# Patient Record
Sex: MALE | Race: WHITE | Employment: OTHER | URBAN - METROPOLITAN AREA
[De-identification: names, ages, dates, MRNs, and addresses within clinical notes are randomized per-mention and may not be internally consistent; named-entity substitution may affect disease eponyms.]

---

## 2017-01-13 ENCOUNTER — TRANSFERRED RECORDS (OUTPATIENT)
Dept: HEALTH INFORMATION MANAGEMENT | Facility: CLINIC | Age: 57
End: 2017-01-13

## 2017-01-16 ENCOUNTER — TELEPHONE (OUTPATIENT)
Dept: NEUROLOGY | Facility: CLINIC | Age: 57
End: 2017-01-16

## 2017-01-16 NOTE — TELEPHONE ENCOUNTER
Who did the lab test & why?  I need the exact lab result before seeing pt.   If this is to check his Depakote level (I think it might be valproic acid), he needs to see whoever is prescribing it.    Thank you.

## 2017-01-16 NOTE — TELEPHONE ENCOUNTER
----- Message from Katya Fulton RN sent at 1/16/2017 12:30 PM CST -----  Regarding: Dr Saldivar pt  Contact: [phone]  States his Mysoline level is very high  25  Results should be coming soon.  Wants you to call him now because he works in a noisy place and would not be able to hear the phone ring.  Thanks.

## 2017-01-16 NOTE — TELEPHONE ENCOUNTER
Patient reports his mysoline level is at 25. Not sure if he is referring to his mysoline or phenobarbitol level.    Patient takes:    primidone (MYSOLINE) 250 MG tablet 450 tablet 3 6/10/2016  No      Si daily: 3 in the am; 1 in the afternoon and 1 at bedtime = 5/day x 90 days = 450 tabs     Called patient to discuss. He states his wife thinks his speech is a little slurry, but patient sounded like he was at his norm from this writer's standpoint. He denied any other possible side effects including: double vision, nausea, mood changes, weakness, lethargy.    Appointment made for tomorrow at noon with Meaghan Gramajo CNP for routine return visit.

## 2017-01-16 NOTE — TELEPHONE ENCOUNTER
Called patient back and LVM to call me back and let me know the followin. Who did the lab test and why?  2. Need the exact lab result faxed to [phone]  3. Was it for mysoline and not Depakote (valproic acid)?    Emailed this message to him as well.

## 2017-01-17 ENCOUNTER — OFFICE VISIT (OUTPATIENT)
Dept: NEUROLOGY | Facility: CLINIC | Age: 57
End: 2017-01-17

## 2017-01-17 VITALS
BODY MASS INDEX: 22.4 KG/M2 | DIASTOLIC BLOOD PRESSURE: 84 MMHG | HEART RATE: 84 BPM | HEIGHT: 71 IN | WEIGHT: 160 LBS | SYSTOLIC BLOOD PRESSURE: 129 MMHG

## 2017-01-17 DIAGNOSIS — R89.9 ABNORMAL LABORATORY TEST: ICD-10-CM

## 2017-01-17 DIAGNOSIS — G25.0 ESSENTIAL TREMOR: Primary | ICD-10-CM

## 2017-01-17 ASSESSMENT — MOVEMENT DISORDERS SOCIETY - UNIFIED PARKINSONS DISEASE RATING SCALE (MDS-UPDRS)
TREMOR: SLIGHT: SHAKING OR TREMOR OCCURS BUT DOES NOT CAUSE PROBLEMS WITH ANY ACTIVITIES.
GETTING_OUT_OF_BED_CAR_DEEP_CHAIR: NORMAL: NOT AT ALL (NO PROBLEMS).
SALIVA_AND_DROOLING: MILD: I HAVE SOME DROOLING DURING SLEEP, BUT NONE WHEN I AM AWAKE.
SPEECH: SLIGHT: MY SPEECH IS SOFT, SLURRED OR UNEVEN, BUT IT DOES NOT CAUSE OTHERS TO ASK ME TO REPEAT MYSELF.
HANDWRITING: SLIGHT: MY WRITING IS SLOW, CLUMSY OR UNEVEN, BUT ALL WORDS ARE CLEAR.
TOTAL_SCORE: INCOMPLETE
FREEZING: NORMAL: NOT AT ALL (NO PROBLEMS).
EATING_TASKS: MILD: I AM SLOW WITH MY EATING AND HAVE OCCASIONAL FOOD SPILLS.  I MAY NEED HELP WITH A FEW TASKS SUCH AS CUTTING MEAT.
DRESSING: NORMAL: NOT AT ALL (NO PROBLEMS).
HYGIENE: NORMAL: NOT AT ALL (NO PROBLEMS).
CHEWING_AND_SWALLOWING: NORMAL: NO PROBLEMS.
HOBBIES_AND_OTHER_ACTIVITIES: NORMAL:  NOT AT ALL (NO PROBLEMS).
TURNING_IN_BED: NORMAL: NOT AT ALL (NO PROBLEMS).

## 2017-01-17 ASSESSMENT — PAIN SCALES - GENERAL: PAINLEVEL: MODERATE PAIN (4)

## 2017-01-17 NOTE — MR AVS SNAPSHOT
After Visit Summary   1/17/2017    Matteo Brooks    MRN: [id number]           Patient Information     Date Of Birth          1960        Visit Information        Provider Department      1/17/2017 12:00 PM Edi Gramajo APRN CNP [facility]        Care Instructions    January 17, 2017    Dear Mr. Matteo Brooks,    Thank you for coming today.  During your visit, we have discussed the following:     __ Your DBS electrical system function (impedance) is normal except for one contact.  This is not new & no intervention is needed. The battery life is also good.  __  Please call if your symptoms worsen.  __  I'll look into the high Primidone level & get back to you.   __  For now, stay on the same dose of primidone & propranolol.    __  For questions, [employer] Support Line is [phone].    To contact our clinic, you may call [phone] or use [employer]fluence message.     It's good to see you!  Return in 6 months.      MANNY Johnson, CNP  [facility]          Follow-ups after your visit        Your next 10 appointments already scheduled     Jul 20, 2017  2:40 PM   (Arrive by 2:25 PM)   Return Movement Disorder with Miguel Saldivar MD   [facility] ([facility])    [address] [place] [id number] [phone]              Who to contact     Please call your clinic at [phone] to:    Ask questions about your health    Make or cancel appointments    Discuss your medicines    Learn about your test results    Speak to your doctor   If you have compliments or concerns about an experience at your clinic, or if you wish to file a complaint, please contact [facility] Patient Relations at [phone] or email us at [email or web address]         Additional Information About Your Visit        MyChart Information     [employer] gives you secure access to your electronic health  "record. If you see a primary care provider, you can also send messages to your care team and make appointments. If you have questions, please call your primary care clinic.  If you do not have a primary care provider, please call [phone] and they will assist you.      Silk is an electronic gateway that provides easy, online access to your medical records. With Silk, you can request a clinic appointment, read your test results, renew a prescription or communicate with your care team.     To access your existing account, please contact your [facility] or call [phone] for assistance.        Care EveryWhere ID     This is your Care EveryWhere ID. This could be used by other organizations to access your Christopher medical records  [id number]        Your Vitals Were     Pulse Height BMI (Body Mass Index)             84 1.803 m (5' 11\") 22.33 kg/m2          Blood Pressure from Last 3 Encounters:   01/17/17 129/84   07/11/16 141/96   06/10/16 141/90    Weight from Last 3 Encounters:   01/17/17 72.576 kg (160 lb)   07/11/16 75.533 kg (166 lb 8.3 oz)   06/10/16 76.204 kg (168 lb)              Today, you had the following     No orders found for display       Primary Care Provider Office Phone # Fax #    Blayne Zhu [phone]       BLAYNE THOMPSON MD [address] [place] [id number]        Thank you!     Thank you for choosing [facility]UROLOG[facility]  for your care. Our goal is always to provide you with excellent care. Hearing back from our patients is one way we can continue to improve our services. Please take a few minutes to complete the written survey that you may receive in the mail after your visit with us. Thank you!             Your Updated Medication List - Protect others around you: Learn how to safely use, store and throw away your medicines at www.disposemymeds.org.          This list is accurate as of: 1/17/17  1:08 PM.  Always use your most " recent med list.                   Brand Name Dispense Instructions for use    calcium carbonate 500 MG tablet    OS-MAXIMO 500 mg [place]. Ca     Take 500 mg by mouth daily       DEPAKOTE 500 MG EC tablet   Generic drug:  divalproex     360 tablet    5 daily:  2 in the morning and 3 at night.       gabapentin 300 MG capsule    NEURONTIN     Take 300 mg by mouth At Bedtime       MAGNESIUM CITRATE PO      Take 400 mg by mouth       MULTIVITAMIN & MINERAL PO      1 daily       OMEGA-3 FISH OIL PO      Take 1 g by mouth daily       primidone 250 MG tablet    MYSOLINE    450 tablet    5 daily: 3 in the am; 1 in the afternoon and 1 at bedtime = 5/day x 90 days = 450 tabs       propranolol 20 MG tablet    INDERAL    720 tablet    3 in am, 3 later in day, and 2 in evening =8 per day       VITAMIN B COMPLEX PO          vitamin D 400 UNITS tablet      Take 1 tablet by mouth daily

## 2017-01-17 NOTE — Clinical Note
"2017       RE: Matteo Brooks  [address]  [place] [address]     Dear Colleague,    Thank you for referring your patient, Matteo Brooks, to the [facility] at [facility]. Please see a copy of my visit note below.    PATIENT: Matteo Brooks    : 1960    NADIA: 2017      REASON FOR VISIT:  Follow up for essential tremor (ET) and to have DBS interrogation and analysis.      HISTORY OF PRESENT ILLNESS:  Mr. Matteo Brooks is a 56-year-old, right-handed  male who came to the [facility] by himself for a follow up visit.  He was last seen in clinic about 6 months ago for a routine follow up visit.  Since then, he had left side DBS generator replacement.      After DBS he has reduced his Primidone by 750 mg.  He is currently he is taking 750 mg in the morning; 250 mg at noon & 250 mg at HS.  He has been on the same dose since      Mood changes.   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     Muscle under dbs wife christopher thrapist, chiropracter, where the lead wire run wanted to know if he's gettign stimulaiton     MEDICATIONS:  Outpatient Prescriptions Marked as Taking for the 17 encounter (Office Visit) with Edi Gramajo APRN CNP   Medication Sig     gabapentin (NEURONTIN) 300 MG capsule Take 300 mg by mouth At Bedtime     primidone (MYSOLINE) 250 MG tablet 5 daily: 3 in the am; 1 in the afternoon and 1 at bedtime = 5/day x 90 days = 450 tabs     propranolol (INDERAL) 20 MG tablet 3 in am, 3 later in day, and 2 in evening =8 per day     Omega-3 Fatty Acids (OMEGA-3 FISH OIL PO) Take 1 g by mouth daily     B Complex Vitamins (VITAMIN B COMPLEX PO)      Cholecalciferol (VITAMIN D) 400 UNITS tablet Take 1 tablet by mouth daily     " "MAGNESIUM CITRATE PO Take 400 mg by mouth     calcium carbonate (OS-MAXIMO 500 MG [place]. CA) 500 MG tablet Take 500 mg by mouth daily     Multiple Vitamins-Minerals (MULTIVITAMIN & MINERAL PO) 1 daily     divalproex (DEPAKOTE) 500 MG EC tablet 5 daily:  2 in the morning and 3 at night.       ALLERGIES: No known allergies.    PAST MEDICAL HISTORY, PAST SURGICAL HISTORY, FAMILY HISTORY AND SOCIAL HISTORY:  Reviewed in Epic.  He reports no new changes.      PHYSICAL EXAM:    VITAL SIGNS:  Blood pressure 129/84, pulse 84, height 1.803 m (5' 11\"), weight 72.576 kg (160 lb). Body mass index is 22.33 kg/(m^2).     GENERAL:  Mr. Brooks is a pleasant  male who is well-groomed and well developed, sitting comfortably in the exam room without any distress.  Affect is appropriate.  He likes to talk about his personal, medical and family history in detail.      MOVEMENT DISORDERS ASSESSMENT:  Speech is without dysarthria. Has slight voice tremor.  He has normal facial expression.  No rest tremor in all extremities.  Slight postural and action tremor in right hand and mild to moderate postural and action tremor in left.  No rigidity in all extremities.  Finger tapping is normal in Rt; and shows mild slowing bilaterally with tremor interruption in the left hand.  Rapid alternating movements show mild slowing with good amplitude bilaterally with slight tremor interference in Lt hand in hand pronation & supination.   Leg agility is normal bilaterally; with irregular rhythm in Lt leg. .  He is able to arise from a chair with arms folded across his chest without difficulty.  Posture is erect.  Gait is slow with normal-length strides.  He has reduced arm swing in Rt.  No sign of imbalance when turning.  Jaime to perform tandem gait & took 5 steps without losing balance.  Postural stability shows retropulsion, but recovers unaided.  He has no body bradykinesia.        DBS Interrogation & Analysis:    Implanted generator:  Left chest " Activa-SC.  Lead placement:  Left Ventral Intermedius Nucleus of Thalamus (VIM).    Left VIM   C +, 2 -  Volts: 2.8 volts  PW: 90 msec  Rate: 185 Hz    Therapy impedance:  727 Ohms  Therapy Current:  3.559 mA    Therapy On:  73%  Battery Service Life:  OK.  2.90 volts.    Electrode Impedance Check: (Amplitude 3.0 volts: PW 80 msec: Rate 100 Hz)   Left Lead: No Problems Found.  High: C & 0; 0 & 1; 0 & 2; and 0 & 3    See scanned report for impedance details.      DBS programming.      His voltage was increased from 2.6-2.8 V.  Although there wasn't  a significant change in the right hand tremor, he might notice the change over time.  No side effects from programming.      ASSESSMENT/PLAN:     Essential tremor.  Mr. Brooks is a 55-year-old  male with a long-standing history of ET and status post unilateral VIM DBS implantation who came to the clinic for a follow up visit.  As mentioned above, since his battery replacement, he has noticed a slight worsening of action tremor in the right hand.  Today his DBS voltage was increased.  He will continue to take primidone and propranolol as above.       We had some discussion about getting the second side surgery to improve the left hand tremor, as it has mild to moderate tremor.  At this point, the patient is not interested to pursue surgery, as he has difficulty taking time off from work.  He has been taking time off due to his wife's illness and does not have enough time off to go through the workup & have the surgery.      He was given an option to return in 3 months for a follow up visit, as there have been some DBS programming changes made today to reevaluate his tremor; however, due to not being able to take time off, he opted to return in 6 months.  He will return sooner as needed.      The total time spent with the patient was 45 minutes, 30 minutes in DBS programming and 15 minutes in addressing ET.      MANNY Johnson, CNP  [facility]         Again, thank you for allowing me to participate in the care of your patient.      Sincerely,    MANNY Sánchez CNP

## 2017-01-17 NOTE — PROGRESS NOTES
"PATIENT: Matteo Brooks    : 1960    NADIA: 2017    REASON FOR VISIT:  Follow up for essential tremor (ET), to have DBS interrogation and analysis, & discuss elevated mysoline level.      HISTORY OF PRESENT ILLNESS:  Mr. Matteo Brooks is a 56-year-old, right-handed  male who came to the [facility] by himself for a follow up visit.  He was last seen in clinic about 6 months ago for a routine follow up visit.       Today he reports getting his mysoline level done as he was having other labs for his annual physical.   There is an elevated mysoline level& normal phenobarbital level.  He denies any neurological symptoms from his baseline.  He used to take a much higher dose of Primidone than he is currently taking.  After his DBS surgery, he has reduced his dose by 750 mg.  His current dose is 750 mg in the morning; 250 mg at noon & 250 mg at HS.  He has been on the same dose since .      Denies mood changes.  Tremor is \"pain in the ass.\"  He continues to work full time.   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  He has learned to live with his tremor.  Due to financial & side effect concerns, he has opted not to go forward with the 2nd side DBS surgery.  He is s/p left VIM DBS implantation with good tremor improvement of the right hand.  He wanted to know if the muscle under DBS wire is stimulated by DBS as his massage therapist & chiropractor are not able to lossen the muscle.    MEDICATIONS:  Medication Sig     gabapentin (NEURONTIN) 300 MG  Take 300 mg by mouth At Bedtime     primidone (MYSOLINE) 250 MG  5 daily: 3 in the am; 1 in the afternoon and 1 at bedtime = 5/day x 90 days = 450 tabs     propranolol (INDERAL) 20 MG  3 in am, 3 later in day, and 2 in evening =8 per day     Omega-3 Fatty Acids  Take 1 g by mouth daily     B Complex Vitamins       Cholecalciferol (VITAMIN D) 400 UNITS " "Take 1 tablet by mouth daily     MAGNESIUM CITRATE PO Take 400 mg by mouth     calcium carbonate (OS-MAXIMO 500 MG [place]. CA) 500 MG tablet Take 500 mg by mouth daily     Multiple Vitamins-Minerals 1 daily     divalproex (DEPAKOTE) 500 MG EC 5 daily:  2 in the morning and 3 at night.       ALLERGIES: No known allergies.    PAST MEDICAL HISTORY, PAST SURGICAL HISTORY, FAMILY HISTORY AND SOCIAL HISTORY:  Reviewed in Epic.  He reports no new changes.      PHYSICAL EXAM:    VITAL SIGNS:  Blood pressure 129/84, pulse 84, height 1.803 m (5' 11\"), weight 72.576 kg (160 lb).  Body mass index is 22.33 kg/(m^2).     GENERAL:  Mr. Brooks is a pleasant  male who is well-groomed and well developed, sitting comfortably in the exam room without any distress.  Affect is appropriate.        MOVEMENT DISORDERS ASSESSMENT:  Speech is without dysarthria. Has slight voice tremor.  He has normal facial expression.  No rest tremor in all extremities.  Slight postural and action tremor in right hand and mild to moderate postural and action tremor in left.  No rigidity in all extremities.  Finger tapping is normal in Rt; and shows mild slowing with tremor interruption in the left hand.  Rapid alternating movements show mild slowing with good amplitude bilaterally.  There slight tremor interference in Lt hand with hand pronation & supination.   Leg agility is normal bilaterally; with irregular rhythm in Lt leg. He is able to arise from a chair with arms folded across his chest without difficulty.  Posture is erect.  Gait is slow with normal-length strides.  No sign of imbalance when turning.  Jaime to perform tandem gait & took 5 steps without losing balance.  Postural stability shows retropulsion, but recovers unaided.  He has no body bradykinesia.        DBS Interrogation & Analysis:    Implanted generator:  Left chest Activa-SC.  Lead placement:  Left Ventral Intermedius Nucleus of Thalamus (VIM).    Left VIM   C +, 2 -  Volts: 2.8 " "volts  PW: 90 msec  Rate: 185 Hz    Therapy On:  73%  Battery Service Life:  OK.  2.90 volts.    Electrode Impedance Check: (Amplitude 3.0 volts: PW 80 msec: Rate 100 Hz)   Left Lead: No Problems Found.  High: C & 0; 0 & 1; 0 & 2; and 0 & 3    See scanned report for impedance details.      ASSESSMENT/PLAN:     Essential tremor.  Mr. Brooks is a 55-year-old  male with a long-standing history of ET and status post Left VIM DBS implantation who came to the clinic for a follow up visit.  He has mild to moderate tremor in left hand, which is not treated with DBS.  He has opted not to do DBS surgery due to financial & side effect concerns.   Although tremor is \"pain in the butt,\" he is able to adapt and continue to work full-time making furniture.      __  He was informed that checking primidone level is not out routine practice.  Will discuss elevated primidone level with Dr. Saldivar & get back to pt.  __  For now, he'll continue taking the same dose of Primidone as above.   __  DBS interrogation & analysis shows open circuit in contact 0.  Since contact 0 is not being used to deliver therapy, no intervention is needed.   __  He was informed that his DBS doesn't stimulate the muscle under the extension wire.   __  Will return to clinic in 6 months for a routine f/u visit.  May return sooner PRN.     ADDENDUM:  Mysoline & phenobarbital labs reviewed with Dr. Saldivar.  He recommended making no changes to his dose.  Mysoline is usually checked if there is concern that pts are not taking the dose & to see if they are.  Dose adjustment is not based on blood level result. Pt was called & left a message with the above.     The total time spent with the patient was 45 minutes, 20 minutes in DBS programming and 25 minutes in addressing ET & abnormal lab.      Meaghan Gramajo, APRN, CNP  [facility]      "

## 2017-01-17 NOTE — PATIENT INSTRUCTIONS
January 17, 2017    Dear Mr. Matteo Brooks,    Thank you for coming today.  During your visit, we have discussed the following:     __ Your DBS electrical system function (impedance) is normal except for one contact.  This is not new & no intervention is needed. The battery life is also good.  __  Please call if your symptoms worsen.  __  I'll look into the high Primidone level & get back to you.   __  For now, stay on the same dose of primidone & propranolol.    __  For questions, [employer] Support Line is [phone].    To contact our clinic, you may call [phone] or use Sionex message.     It's good to see you!  Return in 6 months.      MANNY Johnson, CNP  [facility]

## 2017-03-02 ENCOUNTER — TELEPHONE (OUTPATIENT)
Dept: NEUROLOGY | Facility: CLINIC | Age: 57
End: 2017-03-02

## 2017-03-02 NOTE — TELEPHONE ENCOUNTER
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 He is wondering if he needs to move away from it or find a new job.    Called him back and advised him to contact [employer] to get detailed advice.

## 2017-06-22 DIAGNOSIS — R25.1 TREMOR: ICD-10-CM

## 2017-06-22 DIAGNOSIS — G25.0 BENIGN ESSENTIAL TREMOR: ICD-10-CM

## 2017-06-22 RX ORDER — PRIMIDONE 250 MG/1
TABLET ORAL
Qty: 450 TABLET | Refills: 0 | Status: SHIPPED | OUTPATIENT
Start: 2017-06-22 | End: 2017-07-20

## 2017-06-22 RX ORDER — PROPRANOLOL HYDROCHLORIDE 20 MG/1
TABLET ORAL
Qty: 720 TABLET | Refills: 0 | Status: SHIPPED | OUTPATIENT
Start: 2017-06-22 | End: 2017-07-20
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
## 2017-07-20 ENCOUNTER — TELEPHONE (OUTPATIENT)
Dept: NEUROLOGY | Facility: CLINIC | Age: 57
End: 2017-07-20

## 2017-07-20 ENCOUNTER — OFFICE VISIT (OUTPATIENT)
Dept: NEUROLOGY | Facility: CLINIC | Age: 57
End: 2017-07-20

## 2017-07-20 VITALS — HEART RATE: 74 BPM | HEIGHT: 71 IN | DIASTOLIC BLOOD PRESSURE: 81 MMHG | SYSTOLIC BLOOD PRESSURE: 131 MMHG

## 2017-07-20 DIAGNOSIS — R25.1 TREMOR: ICD-10-CM

## 2017-07-20 DIAGNOSIS — G25.0 BENIGN ESSENTIAL TREMOR: ICD-10-CM

## 2017-07-20 RX ORDER — PROPRANOLOL HYDROCHLORIDE 20 MG/1
TABLET ORAL
Qty: 720 TABLET | Refills: 3 | Status: SHIPPED | OUTPATIENT
Start: 2017-07-20

## 2017-07-20 RX ORDER — GABAPENTIN 300 MG/1
300 CAPSULE ORAL AT BEDTIME
Qty: 90 CAPSULE | Refills: 3 | COMMUNITY
Start: 2017-07-20

## 2017-07-20 RX ORDER — PRIMIDONE 250 MG/1
TABLET ORAL
Qty: 450 TABLET | Refills: 3 | Status: SHIPPED | OUTPATIENT
Start: 2017-07-20

## 2017-07-20 ASSESSMENT — ENCOUNTER SYMPTOMS
MYALGIAS: 1
NECK PAIN: 1
MEMORY LOSS: 0
EYE WATERING: 0
MUSCLE CRAMPS: 1
ARTHRALGIAS: 0
TASTE DISTURBANCE: 0
SPEECH CHANGE: 0
BACK PAIN: 1
WEAKNESS: 0
DECREASED CONCENTRATION: 0
DEPRESSION: 0
NECK MASS: 0
HOARSE VOICE: 0
NERVOUS/ANXIOUS: 0
DISTURBANCES IN COORDINATION: 1
TREMORS: 1
PARALYSIS: 0
INSOMNIA: 0
MUSCLE WEAKNESS: 0
SMELL DISTURBANCE: 0
NUMBNESS: 0
SINUS CONGESTION: 1
EYE IRRITATION: 0
PANIC: 0
TINGLING: 0
EYE PAIN: 0
TROUBLE SWALLOWING: 0
SINUS PAIN: 1
LOSS OF CONSCIOUSNESS: 0
DIZZINESS: 1
SEIZURES: 0
HEADACHES: 1
SORE THROAT: 0
DOUBLE VISION: 0
JOINT SWELLING: 0
EYE REDNESS: 0
STIFFNESS: 0

## 2017-07-20 ASSESSMENT — PAIN SCALES - GENERAL: PAINLEVEL: MODERATE PAIN (4)

## 2017-07-20 NOTE — MR AVS SNAPSHOT
"              After Visit Summary   7/20/2017    Matteo Brooks    MRN: [id number]           Patient Information     Date Of Birth          1960        Visit Information        Provider Department      7/20/2017 2:40 PM Miguel Saldivar MD [facility]        Today's Diagnoses     Tremor        Benign essential tremor           Follow-ups after your visit        Follow-up notes from your care team     Return in about 1 year (around 7/20/2018).      Who to contact     Please call your clinic at [phone] to:    Ask questions about your health    Make or cancel appointments    Discuss your medicines    Learn about your test results    Speak to your doctor   If you have compliments or concerns about an experience at your clinic, or if you wish to file a complaint, please contact [facility] Patient Relations at [phone] or email us at [email or web address]         Additional Information About Your Visit        MyChart Information     [employer] gives you secure access to your electronic health record. If you see a primary care provider, you can also send messages to your care team and make appointments. If you have questions, please call your primary care clinic.  If you do not have a primary care provider, please call [phone] and they will assist you.      [employer] is an electronic gateway that provides easy, online access to your medical records. With [employer], you can request a clinic appointment, read your test results, renew a prescription or communicate with your care team.     To access your existing account, please contact your [facility] or call [phone] for assistance.        Care EveryWhere ID     This is your Care EveryWhere ID. This could be used by other organizations to access your [facility] medical records  [id number]        Your Vitals Were     Pulse Height                74 1.803 m (5' 11\")           Blood " Pressure from Last 3 Encounters:   07/20/17 131/81   01/17/17 129/84   07/11/16 (!) 141/96    Weight from Last 3 Encounters:   01/17/17 72.6 kg (160 lb)   07/11/16 75.5 kg (166 lb 8.3 oz)   06/10/16 76.2 kg (168 lb)              Today, you had the following     No orders found for display         Where to get your medicines      These medications were sent to [id number] - [place], [place] - [address] AT [facility] [address]  [address], [place] [address]     Phone:  [phone]     primidone 250 MG tablet    propranolol 20 MG tablet          Primary Care Provider Office Phone # Fax #    Blayne Zhu [phone]       BLAYNE THOMPSON MD [address] [facility] [address]        Equal Access to Services     [facility]: Hadii christine mccullough hadasho Soomaali, waaxda luqadaha, qaybta kaalmada adeegyada, boby doss . So [facility] [phone].    ATENCIÓN: Si habla español, tiene a zaragoza disposición servicios gratuitos de asistencia lingüística. Llame al [phone].    We comply with applicable federal civil rights laws and [place] laws. We do not discriminate on the basis of race, color, national origin, age, disability sex, sexual orientation or gender identity.            Thank you!     Thank you for choosing [facility]  for your care. Our goal is always to provide you with excellent care. Hearing back from our patients is one way we can continue to improve our services. Please take a few minutes to complete the written survey that you may receive in the mail after your visit with us. Thank you!             Your Updated Medication List - Protect others around you: Learn how to safely use, store and throw away your medicines at www.disposemymeds.org.          This list is accurate as of: 7/20/17  2:49 PM.  Always use your most recent med list.                   Brand Name Dispense Instructions for use Diagnosis    calcium  carbonate 1250 MG tablet    OS-MAXIMO 500 mg [place]. [place]     Take 500 mg by mouth daily    Essential tremor       DEPAKOTE 500 MG EC tablet   Generic drug:  divalproex     360 tablet    5 daily:  2 in the morning and 3 at night.    Bipolar disorder (H)       gabapentin 300 MG capsule    NEURONTIN    90 capsule    Take 1 capsule (300 mg) by mouth At Bedtime        MAGNESIUM CITRATE PO      Take 400 mg by mouth    Essential tremor       MULTIVITAMIN & MINERAL PO      1 daily        OMEGA-3 FISH OIL PO      Take 1 g by mouth daily    Essential tremor       primidone 250 MG tablet    MYSOLINE    450 tablet    5 daily: 3 in the am; 1 in the afternoon and 1 at bedtime = 5/day x 90 days = 450 tabs    Tremor       propranolol 20 MG tablet    INDERAL    720 tablet    3 in am, 3 later in day, and 2 in evening =8 per day    Benign essential tremor       VITAMIN B COMPLEX PO       Essential tremor       vitamin D 400 UNITS tablet      Take 1 tablet by mouth daily    Essential tremor

## 2017-07-20 NOTE — TELEPHONE ENCOUNTER
Interrogated patient's DBS Activa SC battery. Patient has LVIM. All impedances were checked at 3.0 Volts and were WNL except C/0, 0/1, 0/2, 0/3. See [employer] Neuromodulation sheets scanned. Patient informed he has an open circuit at contact 0, no MRIs should be done on him.    Therapy impedance = 762 ohms, 3.671 mA    Using contacts = C+/2-    Model 55030  NLA [id number]    Battery voltage 2.87      MRN [id number]  Diagnosis Essential tremor
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
## 2017-11-07 ENCOUNTER — HOSPITAL ENCOUNTER (EMERGENCY)
Facility: CLINIC | Age: 57
Discharge: LEFT BEFORE DISCHARGE | End: 2017-11-07
Payer: COMMERCIAL

## 2017-11-07 VITALS
BODY MASS INDEX: 23.99 KG/M2 | DIASTOLIC BLOOD PRESSURE: 83 MMHG | TEMPERATURE: 96.2 F | WEIGHT: 172 LBS | HEART RATE: 69 BPM | SYSTOLIC BLOOD PRESSURE: 124 MMHG | RESPIRATION RATE: 16 BRPM | OXYGEN SATURATION: 96 %

## 2017-11-08 NOTE — ED NOTES
Patient reports it's taking too long to be seen.  Informed patient, there are patients who are needing to been seen that are ahead of him that are waiting still to be seen.  Patient reassured he will be seen.  He is unwilling to wait.  He exited ED ambulatory.

## 2018-06-07 ENCOUNTER — TELEPHONE (OUTPATIENT)
Dept: NEUROLOGY | Facility: CLINIC | Age: 58
End: 2018-06-07

## 2018-06-11 ENCOUNTER — TELEPHONE (OUTPATIENT)
Dept: NEUROLOGY | Facility: CLINIC | Age: 58
End: 2018-06-11

## 2018-07-06 DIAGNOSIS — G25.0 BENIGN ESSENTIAL TREMOR: ICD-10-CM

## 2018-07-06 DIAGNOSIS — R25.1 TREMOR: ICD-10-CM

## 2018-07-06 RX ORDER — PRIMIDONE 250 MG/1
TABLET ORAL
Qty: 450 TABLET | Refills: 0 | OUTPATIENT
Start: 2018-07-06

## 2018-07-06 RX ORDER — PROPRANOLOL HYDROCHLORIDE 20 MG/1
TABLET ORAL
Qty: 720 TABLET | Refills: 0 | OUTPATIENT
Start: 2018-07-06

## 2019-10-03 ENCOUNTER — HEALTH MAINTENANCE LETTER (OUTPATIENT)
Age: 59
End: 2019-10-03

## 2020-11-07 ENCOUNTER — HEALTH MAINTENANCE LETTER (OUTPATIENT)
Age: 60
End: 2020-11-07

## 2021-05-25 ENCOUNTER — RECORDS - HEALTHEAST (OUTPATIENT)
Dept: ADMINISTRATIVE | Facility: CLINIC | Age: 61
End: 2021-05-25

## 2021-06-01 ENCOUNTER — RECORDS - HEALTHEAST (OUTPATIENT)
Dept: ADMINISTRATIVE | Facility: CLINIC | Age: 61
End: 2021-06-01

## 2021-06-02 ENCOUNTER — RECORDS - HEALTHEAST (OUTPATIENT)
Dept: ADMINISTRATIVE | Facility: CLINIC | Age: 61
End: 2021-06-02

## 2021-09-05 ENCOUNTER — HEALTH MAINTENANCE LETTER (OUTPATIENT)
Age: 61
End: 2021-09-05

## 2021-12-26 ENCOUNTER — HEALTH MAINTENANCE LETTER (OUTPATIENT)
Age: 61
End: 2021-12-26

## 2022-07-20 ENCOUNTER — HOSPITAL ENCOUNTER (OUTPATIENT)
Dept: CT IMAGING | Facility: HOSPITAL | Age: 62
Discharge: HOME | End: 2022-07-20
Attending: THORACIC SURGERY (CARDIOTHORACIC VASCULAR SURGERY) | Admitting: THORACIC SURGERY (CARDIOTHORACIC VASCULAR SURGERY)
Payer: COMMERCIAL

## 2022-07-20 DIAGNOSIS — C34.90 NON-SMALL CELL LUNG CANCER (H): ICD-10-CM

## 2022-07-20 PROCEDURE — 71250 CT THORAX DX C-: CPT

## 2022-08-07 ENCOUNTER — HEALTH MAINTENANCE LETTER (OUTPATIENT)
Age: 62
End: 2022-08-07

## 2022-10-05 ENCOUNTER — HOSPITAL ENCOUNTER (OUTPATIENT)
Dept: CT IMAGING | Facility: HOSPITAL | Age: 62
Discharge: HOME | End: 2022-10-05
Attending: PSYCHIATRY & NEUROLOGY | Admitting: PSYCHIATRY & NEUROLOGY
Payer: COMMERCIAL

## 2022-10-05 DIAGNOSIS — G25.0 ESSENTIAL TREMOR: ICD-10-CM

## 2022-10-05 DIAGNOSIS — M54.2 NECK PAIN: ICD-10-CM

## 2022-10-05 DIAGNOSIS — G31.84 MILD COGNITIVE IMPAIRMENT: ICD-10-CM

## 2022-10-05 DIAGNOSIS — R20.0 NUMBNESS: ICD-10-CM

## 2022-10-05 PROCEDURE — 72125 CT NECK SPINE W/O DYE: CPT

## 2023-01-18 ENCOUNTER — HOSPITAL ENCOUNTER (OUTPATIENT)
Dept: CT IMAGING | Facility: HOSPITAL | Age: 63
Discharge: HOME | End: 2023-01-18
Attending: THORACIC SURGERY (CARDIOTHORACIC VASCULAR SURGERY) | Admitting: THORACIC SURGERY (CARDIOTHORACIC VASCULAR SURGERY)
Payer: COMMERCIAL

## 2023-01-18 DIAGNOSIS — C34.91 NON-SMALL CELL CANCER OF RIGHT LUNG (H): ICD-10-CM

## 2023-01-18 PROCEDURE — 71250 CT THORAX DX C-: CPT

## 2023-07-05 ENCOUNTER — HOSPITAL ENCOUNTER (OUTPATIENT)
Dept: CT IMAGING | Facility: HOSPITAL | Age: 63
Discharge: HOME | End: 2023-07-05
Attending: THORACIC SURGERY (CARDIOTHORACIC VASCULAR SURGERY) | Admitting: THORACIC SURGERY (CARDIOTHORACIC VASCULAR SURGERY)
Payer: COMMERCIAL

## 2023-07-05 DIAGNOSIS — C34.11 PRIMARY NON-SMALL CELL CARCINOMA OF UPPER LOBE OF RIGHT LUNG (H): ICD-10-CM

## 2023-07-05 PROCEDURE — 71250 CT THORAX DX C-: CPT

## 2023-09-02 ENCOUNTER — HEALTH MAINTENANCE LETTER (OUTPATIENT)
Age: 63
End: 2023-09-02

## 2024-06-19 ENCOUNTER — HOSPITAL ENCOUNTER (OUTPATIENT)
Dept: CT IMAGING | Facility: HOSPITAL | Age: 64
Discharge: HOME | End: 2024-06-19
Attending: PHYSICIAN ASSISTANT | Admitting: PHYSICIAN ASSISTANT
Payer: COMMERCIAL

## 2024-06-19 DIAGNOSIS — C34.11 MALIGNANT NEOPLASM OF UPPER LOBE OF RIGHT LUNG (H): ICD-10-CM

## 2024-06-19 PROCEDURE — 71250 CT THORAX DX C-: CPT

## 2024-10-26 ENCOUNTER — HEALTH MAINTENANCE LETTER (OUTPATIENT)
Age: 64
End: 2024-10-26

## 2024-12-23 ENCOUNTER — HOSPITAL ENCOUNTER (OUTPATIENT)
Dept: CT IMAGING | Facility: HOSPITAL | Age: 64
Discharge: HOME | End: 2024-12-23
Attending: PHYSICIAN ASSISTANT | Admitting: PHYSICIAN ASSISTANT
Payer: COMMERCIAL

## 2024-12-23 DIAGNOSIS — Z85.118 PERSONAL HISTORY OF OTHER MALIGNANT NEOPLASM OF BRONCHUS AND LUNG: ICD-10-CM

## 2024-12-23 PROCEDURE — 71250 CT THORAX DX C-: CPT
